# Patient Record
Sex: MALE | Race: BLACK OR AFRICAN AMERICAN | URBAN - NONMETROPOLITAN AREA
[De-identification: names, ages, dates, MRNs, and addresses within clinical notes are randomized per-mention and may not be internally consistent; named-entity substitution may affect disease eponyms.]

---

## 2021-10-29 ENCOUNTER — IMPORTED ENCOUNTER (OUTPATIENT)
Dept: URBAN - NONMETROPOLITAN AREA CLINIC 1 | Facility: CLINIC | Age: 51
End: 2021-10-29

## 2021-10-29 PROCEDURE — S0620 ROUTINE OPHTHALMOLOGICAL EXA: HCPCS

## 2021-10-29 PROCEDURE — 92310 CONTACT LENS FITTING OU: CPT

## 2021-10-29 NOTE — PATIENT DISCUSSION
Presbyopia OUDiscussed refractive status with patientNew glasses and CL Rx given todayContinue to monitor RTC 1 year complete

## 2022-04-09 ASSESSMENT — VISUAL ACUITY
OS_SC: 20/25-BLURRY
OD_SC: 20/25-BLURRY

## 2022-04-09 ASSESSMENT — TONOMETRY
OS_IOP_MMHG: 10
OD_IOP_MMHG: 10

## 2023-10-09 ENCOUNTER — ESTABLISHED PATIENT (OUTPATIENT)
Dept: URBAN - NONMETROPOLITAN AREA CLINIC 1 | Facility: CLINIC | Age: 53
End: 2023-10-09

## 2023-10-09 DIAGNOSIS — H52.13: ICD-10-CM

## 2023-10-09 PROCEDURE — 92310-E CONTACT LENS FITTING ESTABLISH PATIENT

## 2023-10-09 PROCEDURE — S0621 ROUTINE OPHTHALMOLOGICAL EXA: HCPCS

## 2023-10-09 ASSESSMENT — TONOMETRY
OD_IOP_MMHG: 15
OS_IOP_MMHG: 15

## 2023-10-09 ASSESSMENT — VISUAL ACUITY
OU_CC: 20/20
OS_CC: 20/22
OD_CC: 20/20

## 2024-10-10 ENCOUNTER — COMPREHENSIVE EXAM (OUTPATIENT)
Dept: URBAN - NONMETROPOLITAN AREA CLINIC 1 | Facility: CLINIC | Age: 54
End: 2024-10-10

## 2024-10-10 DIAGNOSIS — H52.13: ICD-10-CM

## 2024-10-10 PROCEDURE — S0621AEC ROUTINE OPH EXAM INCLUDES REF/ EST PATIENT

## 2024-10-10 PROCEDURE — 92310-1 LEVEL 1 CONTACT LENS MANAGEMENT
